# Patient Record
Sex: FEMALE | Race: WHITE
[De-identification: names, ages, dates, MRNs, and addresses within clinical notes are randomized per-mention and may not be internally consistent; named-entity substitution may affect disease eponyms.]

---

## 2023-07-19 ENCOUNTER — HOSPITAL ENCOUNTER
Age: 68
Discharge: HOME | End: 2023-07-19
Payer: MEDICARE

## 2023-07-19 DIAGNOSIS — M72.2: ICD-10-CM

## 2023-07-19 DIAGNOSIS — M70.72: ICD-10-CM

## 2023-07-19 DIAGNOSIS — M76.822: Primary | ICD-10-CM

## 2023-07-19 PROCEDURE — 73721 MRI JNT OF LWR EXTRE W/O DYE: CPT

## 2023-09-05 ENCOUNTER — HOSPITAL ENCOUNTER (EMERGENCY)
Age: 68
Discharge: HOME | End: 2023-09-05
Payer: MEDICARE

## 2023-09-05 VITALS
TEMPERATURE: 98.78 F | RESPIRATION RATE: 16 BRPM | SYSTOLIC BLOOD PRESSURE: 121 MMHG | HEART RATE: 78 BPM | DIASTOLIC BLOOD PRESSURE: 77 MMHG | OXYGEN SATURATION: 96 %

## 2023-09-05 VITALS — BODY MASS INDEX: 34.9 KG/M2
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
## 2023-09-05 DIAGNOSIS — S59.812A: ICD-10-CM

## 2023-09-05 DIAGNOSIS — Z79.899: ICD-10-CM

## 2023-09-05 DIAGNOSIS — S60.222A: ICD-10-CM

## 2023-09-05 DIAGNOSIS — S93.402A: Primary | ICD-10-CM

## 2023-09-05 PROCEDURE — 73130 X-RAY EXAM OF HAND: CPT

## 2023-09-05 PROCEDURE — 73610 X-RAY EXAM OF ANKLE: CPT

## 2023-09-05 PROCEDURE — 99284 EMERGENCY DEPT VISIT MOD MDM: CPT

## 2023-09-05 PROCEDURE — 73590 X-RAY EXAM OF LOWER LEG: CPT

## 2023-09-05 PROCEDURE — 73090 X-RAY EXAM OF FOREARM: CPT

## 2023-09-29 ENCOUNTER — HOSPITAL ENCOUNTER
Dept: HOSPITAL 101 - RAD | Age: 68
Discharge: HOME | End: 2023-09-29
Payer: MEDICARE

## 2023-09-29 DIAGNOSIS — S99.912D: Primary | ICD-10-CM

## 2023-09-29 DIAGNOSIS — S69.92XD: ICD-10-CM

## 2023-09-29 PROCEDURE — 73610 X-RAY EXAM OF ANKLE: CPT

## 2023-09-29 PROCEDURE — 73110 X-RAY EXAM OF WRIST: CPT

## 2023-10-11 ENCOUNTER — HOSPITAL ENCOUNTER
Dept: HOSPITAL 101 - RAD | Age: 68
Discharge: HOME | End: 2023-10-11
Payer: MEDICARE

## 2023-10-11 ENCOUNTER — TELEPHONE (OUTPATIENT)
Dept: CARDIOLOGY | Facility: CLINIC | Age: 68
End: 2023-10-11
Payer: COMMERCIAL

## 2023-10-11 DIAGNOSIS — M20.12: Primary | ICD-10-CM

## 2023-10-11 DIAGNOSIS — M24.674: ICD-10-CM

## 2023-10-11 DIAGNOSIS — M19.072: ICD-10-CM

## 2023-10-11 DIAGNOSIS — M76.821: ICD-10-CM

## 2023-10-11 DIAGNOSIS — M19.071: ICD-10-CM

## 2023-10-11 PROCEDURE — 73610 X-RAY EXAM OF ANKLE: CPT

## 2023-10-11 PROCEDURE — 73630 X-RAY EXAM OF FOOT: CPT

## 2023-10-11 NOTE — TELEPHONE ENCOUNTER
Patient phoned states she is actively having chest pain. Just had appointment with her Pediatrist , took BP in office 144/84. Patient had taken nitroglycerin with relief that lasted for 10-15 minutes. Advised patient to go to ER for chest pain. States she is on her way. Will contact office with update on her condition.

## 2023-10-25 ENCOUNTER — APPOINTMENT (OUTPATIENT)
Dept: CARDIOLOGY | Facility: CLINIC | Age: 68
End: 2023-10-25
Payer: COMMERCIAL

## 2024-05-08 ENCOUNTER — HOSPITAL ENCOUNTER
Dept: HOSPITAL 101 - EC | Age: 69
Discharge: HOME | End: 2024-05-08
Payer: MEDICARE

## 2024-05-08 DIAGNOSIS — M76.821: ICD-10-CM

## 2024-05-08 DIAGNOSIS — M76.822: ICD-10-CM

## 2024-05-08 DIAGNOSIS — M20.12: Primary | ICD-10-CM

## 2024-05-08 DIAGNOSIS — M20.11: ICD-10-CM

## 2024-05-08 PROCEDURE — 73630 X-RAY EXAM OF FOOT: CPT

## 2024-05-08 PROCEDURE — 73610 X-RAY EXAM OF ANKLE: CPT

## 2024-09-11 ENCOUNTER — HOSPITAL ENCOUNTER
Dept: HOSPITAL 101 - EC | Age: 69
Discharge: HOME | End: 2024-09-11
Payer: MEDICARE

## 2024-09-11 DIAGNOSIS — M20.12: ICD-10-CM

## 2024-09-11 DIAGNOSIS — M24.674: ICD-10-CM

## 2024-09-11 DIAGNOSIS — M79.672: ICD-10-CM

## 2024-09-11 DIAGNOSIS — M79.671: Primary | ICD-10-CM

## 2024-09-11 PROCEDURE — 73630 X-RAY EXAM OF FOOT: CPT

## 2024-09-24 DIAGNOSIS — E78.2 MIXED HYPERLIPIDEMIA: Primary | ICD-10-CM

## 2024-09-25 RX ORDER — ATORVASTATIN CALCIUM 40 MG/1
40 TABLET, FILM COATED ORAL DAILY
Qty: 28 TABLET | Refills: 11 | Status: SHIPPED | OUTPATIENT
Start: 2024-09-25

## 2024-11-04 ENCOUNTER — HOSPITAL ENCOUNTER
Dept: HOSPITAL 101 - MRI | Age: 69
Discharge: HOME | End: 2024-11-04
Payer: MEDICARE

## 2024-11-04 DIAGNOSIS — M76.821: Primary | ICD-10-CM

## 2024-11-04 PROCEDURE — 73721 MRI JNT OF LWR EXTRE W/O DYE: CPT

## 2024-11-21 ENCOUNTER — TELEPHONE (OUTPATIENT)
Dept: CARDIOLOGY | Facility: CLINIC | Age: 69
End: 2024-11-21
Payer: COMMERCIAL

## 2024-11-21 NOTE — TELEPHONE ENCOUNTER
Patient phones stating she has been experiencing some bilateral swelling in her lower extremities for a couple of months. Reports it is getting worse and her legs hurt and cramp. She states are calves no longer feel soft but are starting to feel firm and hard. PCP prescribed furosemide 40 mg bid but patient states it is not helping. She is looking to get in for an office visit. Patient last seen 10/25/2022 and was instructed to follow up on an annual basis. Please advise

## 2024-11-21 NOTE — TELEPHONE ENCOUNTER
Spoke with patient and informed her she would be added to the wait list.     Sent to clerical to add patient

## 2024-12-13 ENCOUNTER — APPOINTMENT (OUTPATIENT)
Dept: CARDIOLOGY | Facility: CLINIC | Age: 69
End: 2024-12-13
Payer: COMMERCIAL

## 2025-01-31 ENCOUNTER — APPOINTMENT (OUTPATIENT)
Dept: CARDIOLOGY | Facility: CLINIC | Age: 70
End: 2025-01-31
Payer: COMMERCIAL

## 2025-01-31 VITALS
DIASTOLIC BLOOD PRESSURE: 78 MMHG | HEART RATE: 78 BPM | WEIGHT: 175.4 LBS | BODY MASS INDEX: 34.44 KG/M2 | SYSTOLIC BLOOD PRESSURE: 116 MMHG | HEIGHT: 60 IN

## 2025-01-31 DIAGNOSIS — Z78.9 NEVER SMOKED TOBACCO: ICD-10-CM

## 2025-01-31 DIAGNOSIS — E66.811 OBESITY, CLASS I, BMI 30-34.9: ICD-10-CM

## 2025-01-31 DIAGNOSIS — I89.0 LYMPHEDEMA: Primary | ICD-10-CM

## 2025-01-31 DIAGNOSIS — Z95.5 STENTED CORONARY ARTERY: ICD-10-CM

## 2025-01-31 DIAGNOSIS — E78.2 MIXED HYPERLIPIDEMIA: ICD-10-CM

## 2025-01-31 DIAGNOSIS — I25.10 CORONARY ARTERY DISEASE INVOLVING NATIVE CORONARY ARTERY OF NATIVE HEART WITHOUT ANGINA PECTORIS: ICD-10-CM

## 2025-01-31 PROBLEM — R60.9 EDEMA: Status: ACTIVE | Noted: 2025-01-31

## 2025-01-31 PROCEDURE — 1159F MED LIST DOCD IN RCRD: CPT | Performed by: INTERNAL MEDICINE

## 2025-01-31 PROCEDURE — 99214 OFFICE O/P EST MOD 30 MIN: CPT | Performed by: INTERNAL MEDICINE

## 2025-01-31 PROCEDURE — 1036F TOBACCO NON-USER: CPT | Performed by: INTERNAL MEDICINE

## 2025-01-31 PROCEDURE — 3008F BODY MASS INDEX DOCD: CPT | Performed by: INTERNAL MEDICINE

## 2025-01-31 RX ORDER — GABAPENTIN 300 MG/1
300 CAPSULE ORAL 3 TIMES DAILY
COMMUNITY
Start: 2024-05-08

## 2025-01-31 RX ORDER — MIRABEGRON 50 MG/1
1 TABLET, FILM COATED, EXTENDED RELEASE ORAL DAILY
COMMUNITY
Start: 2023-09-05

## 2025-01-31 RX ORDER — TRAZODONE HYDROCHLORIDE 100 MG/1
1 TABLET ORAL NIGHTLY
COMMUNITY
Start: 2024-04-30

## 2025-01-31 RX ORDER — FUROSEMIDE 40 MG/1
40 TABLET ORAL 2 TIMES DAILY
COMMUNITY
Start: 2024-06-03 | End: 2025-01-31 | Stop reason: SDUPTHER

## 2025-01-31 RX ORDER — PAROXETINE 30 MG/1
1 TABLET, FILM COATED ORAL
COMMUNITY
Start: 2024-09-25

## 2025-01-31 RX ORDER — MELOXICAM 15 MG/1
1 TABLET ORAL DAILY
COMMUNITY

## 2025-01-31 RX ORDER — ASPIRIN 81 MG/1
81 TABLET ORAL DAILY
COMMUNITY
Start: 2022-10-25

## 2025-01-31 RX ORDER — BUSPIRONE HYDROCHLORIDE 30 MG/1
1 TABLET ORAL 2 TIMES DAILY
COMMUNITY
Start: 2021-05-17

## 2025-01-31 RX ORDER — FUROSEMIDE 40 MG/1
40 TABLET ORAL DAILY
Start: 2025-01-31

## 2025-01-31 RX ORDER — NITROGLYCERIN 0.4 MG/1
0.4 TABLET SUBLINGUAL EVERY 5 MIN PRN
COMMUNITY
Start: 2021-02-22

## 2025-01-31 NOTE — PROGRESS NOTES
Subjective   Stacy Willett is a 69 y.o. female       Chief Complaint    Follow-up          HPI   Patient is in the office for follow-up for the problems noted below.  Her main concern today was development of swelling of the lower extremities which is consistent with lymphedema.  She has reported no angina dyspnea palpitations or shortness of breath.  She has been compliant taking her statin and aspirin.  There has been no blood work since her last visit with me which was over 2 years ago.  She maintains active lifestyle and lives in [facility].  She lives with her brother.    ASSESSMENT AND PLAN:      1. Coronary artery disease, status post previous PCI of the left circumflex 2009, with no recurrent disease. Emphasized need to exercise, weight control, and continue present medical therapy. Her last stress test was in 2022 and was normal  2. Hyperlipidemia, on medical therapy, patient need lipid profile which I ordered, target LDL 70 or less  3.  Class I obesity. The patient was advised to work on her low calorie diet and more regular exercise, she is interested in using Ozempic which I do not have any objections  4.  Lymphedema, education regarding the lymphedema was provided, compression stockings were recommended, diuretic therapy was discouraged.  Weight loss was advocated.     Annual visit will be scheduled  Review of Systems   Cardiovascular:  Positive for leg swelling.            Vitals:    01/31/25 1422   BP: 116/78   BP Location: Left arm   Patient Position: Sitting   Pulse: 78   Weight: 79.6 kg (175 lb 6.4 oz)   Height: 1.524 m (5')        Objective   Physical Exam  Constitutional:       Appearance: Normal appearance.   HENT:      Nose: Nose normal.   Neck:      Vascular: No carotid bruit.   Cardiovascular:      Rate and Rhythm: Normal rate.      Pulses: Normal pulses.      Heart sounds: Normal heart sounds.   Pulmonary:      Effort: Pulmonary effort is normal.   Abdominal:      General: Bowel sounds are  normal.      Palpations: Abdomen is soft.   Musculoskeletal:         General: Normal range of motion.      Cervical back: Normal range of motion.      Right lower leg: No edema.      Left lower leg: No edema.   Skin:     General: Skin is warm and dry.   Neurological:      General: No focal deficit present.      Mental Status: She is alert.   Psychiatric:         Mood and Affect: Mood normal.         Behavior: Behavior normal.         Thought Content: Thought content normal.         Judgment: Judgment normal.         Allergies  Morphine, Dilantin infatabs [phenytoin], and Topamax [topiramate]     Current Medications    Current Outpatient Medications:     aspirin 81 mg EC tablet, Take 1 tablet (81 mg) by mouth once daily., Disp: , Rfl:     atorvastatin (Lipitor) 40 mg tablet, TAKE ONE TABLET BY MOUTH ONCE DAILY, Disp: 28 tablet, Rfl: 11    busPIRone (Buspar) 30 mg tablet, Take 1 tablet (30 mg) by mouth 2 times a day., Disp: , Rfl:     gabapentin (Neurontin) 300 mg capsule, Take 1 capsule (300 mg) by mouth 3 times a day., Disp: , Rfl:     meloxicam (Mobic) 15 mg tablet, Take 1 tablet (15 mg) by mouth once daily., Disp: , Rfl:     Myrbetriq 50 mg tablet extended release 24 hr 24 hr tablet, Take 1 tablet (50 mg) by mouth once daily., Disp: , Rfl:     nitroglycerin (Nitrostat) 0.4 mg SL tablet, Place 1 tablet (0.4 mg) under the tongue every 5 minutes if needed for chest pain., Disp: , Rfl:     PARoxetine (Paxil) 30 mg tablet, Take 1 tablet (30 mg) by mouth once daily in the morning. Take before meals., Disp: , Rfl:     traZODone (Desyrel) 100 mg tablet, Take 1 tablet (100 mg) by mouth once daily at bedtime., Disp: , Rfl:     furosemide (Lasix) 40 mg tablet, Take 1 tablet (40 mg) by mouth once daily., Disp: , Rfl:                      Assessment/Plan   1. Lymphedema  Follow Up In Cardiology    Alanine Aminotransferase    Aspartate Aminotransferase    Basic Metabolic Panel    CBC    furosemide (Lasix) 40 mg tablet     Alanine Aminotransferase    Aspartate Aminotransferase    Basic Metabolic Panel    CBC      2. Mixed hyperlipidemia        3. BMI 34.0-34.9,adult        4. Never smoked tobacco        5. Coronary artery disease involving native coronary artery of native heart without angina pectoris        6. Stented coronary artery        7. Obesity, Class I, BMI 30-34.9                 Scribe Attestation  By signing my name below, I, Bonnie HARRIS RN   , Scribe   attest that this documentation has been prepared under the direction and in the presence of Maria Del Carmen Perkins MD.     Provider Attestation - Scribe documentation    All medical record entries made by the Scribe were at my direction and personally dictated by me. I have reviewed the chart and agree that the record accurately reflects my personal performance of the history, physical exam, discussion and plan.

## 2025-01-31 NOTE — PATIENT INSTRUCTIONS
Please bring all medicines, vitamins, and herbal supplements with you when you come to the office.    Prescriptions will not be filled unless you are compliant with your follow up appointments or have a follow up appointment scheduled as per instruction of your physician. Refills should be requested at the time of your visit.     BMI was above normal measurement. Current weight: 79.6 kg (175 lb 6.4 oz)  Weight change since last visit (-) denotes wt loss -2.6 lbs   Weight loss needed to achieve BMI 25: 47.7 Lbs  Weight loss needed to achieve BMI 30: 22.1 Lbs  Provided instructions on dietary changes  Provided instructions on exercise.    Decrease lasix to once a day then every other day     Compression stocking pressure should be 25-30

## 2025-01-31 NOTE — LETTER
January 31, 2025     Magali Perales PA-C  [address]  [facility] [place] [address]    Patient: Stacy Willett   YOB: 1955   Date of Visit: 1/31/2025       Dear Dr. Magali Perales PA-C:    Thank you for referring Stacy Willett to me for evaluation. Below are my notes for this consultation.  If you have questions, please do not hesitate to call me. I look forward to following your patient along with you.       Sincerely,     Maria Del Carmen Perkins MD      CC: No Recipients  ______________________________________________________________________________________    Subjective   Stacy Willett is a 69 y.o. female       Chief Complaint    Follow-up          HPI   Patient is in the office for follow-up for the problems noted below.  Her main concern today was development of swelling of the lower extremities which is consistent with lymphedema.  She has reported no angina dyspnea palpitations or shortness of breath.  She has been compliant taking her statin and aspirin.  There has been no blood work since her last visit with me which was over 2 years ago.  She maintains active lifestyle and lives in [facility].  She lives with her brother.    ASSESSMENT AND PLAN:      1. Coronary artery disease, status post previous PCI of the left circumflex 2009, with no recurrent disease. Emphasized need to exercise, weight control, and continue present medical therapy. Her last stress test was in 2022 and was normal  2. Hyperlipidemia, on medical therapy, patient need lipid profile which I ordered, target LDL 70 or less  3.  Class I obesity. The patient was advised to work on her low calorie diet and more regular exercise, she is interested in using Ozempic which I do not have any objections  4.  Lymphedema, education regarding the lymphedema was provided, compression stockings were recommended, diuretic therapy was discouraged.  Weight loss was advocated.     Annual visit will be scheduled  Review of Systems    Cardiovascular:  Positive for leg swelling.            Vitals:    01/31/25 1422   BP: 116/78   BP Location: Left arm   Patient Position: Sitting   Pulse: 78   Weight: 79.6 kg (175 lb 6.4 oz)   Height: 1.524 m (5')        Objective   Physical Exam  Constitutional:       Appearance: Normal appearance.   HENT:      Nose: Nose normal.   Neck:      Vascular: No carotid bruit.   Cardiovascular:      Rate and Rhythm: Normal rate.      Pulses: Normal pulses.      Heart sounds: Normal heart sounds.   Pulmonary:      Effort: Pulmonary effort is normal.   Abdominal:      General: Bowel sounds are normal.      Palpations: Abdomen is soft.   Musculoskeletal:         General: Normal range of motion.      Cervical back: Normal range of motion.      Right lower leg: No edema.      Left lower leg: No edema.   Skin:     General: Skin is warm and dry.   Neurological:      General: No focal deficit present.      Mental Status: She is alert.   Psychiatric:         Mood and Affect: Mood normal.         Behavior: Behavior normal.         Thought Content: Thought content normal.         Judgment: Judgment normal.         Allergies  Morphine, Dilantin infatabs [phenytoin], and Topamax [topiramate]     Current Medications    Current Outpatient Medications:   •  aspirin 81 mg EC tablet, Take 1 tablet (81 mg) by mouth once daily., Disp: , Rfl:   •  atorvastatin (Lipitor) 40 mg tablet, TAKE ONE TABLET BY MOUTH ONCE DAILY, Disp: 28 tablet, Rfl: 11  •  busPIRone (Buspar) 30 mg tablet, Take 1 tablet (30 mg) by mouth 2 times a day., Disp: , Rfl:   •  gabapentin (Neurontin) 300 mg capsule, Take 1 capsule (300 mg) by mouth 3 times a day., Disp: , Rfl:   •  meloxicam (Mobic) 15 mg tablet, Take 1 tablet (15 mg) by mouth once daily., Disp: , Rfl:   •  Myrbetriq 50 mg tablet extended release 24 hr 24 hr tablet, Take 1 tablet (50 mg) by mouth once daily., Disp: , Rfl:   •  nitroglycerin (Nitrostat) 0.4 mg SL tablet, Place 1 tablet (0.4 mg) under the  tongue every 5 minutes if needed for chest pain., Disp: , Rfl:   •  PARoxetine (Paxil) 30 mg tablet, Take 1 tablet (30 mg) by mouth once daily in the morning. Take before meals., Disp: , Rfl:   •  traZODone (Desyrel) 100 mg tablet, Take 1 tablet (100 mg) by mouth once daily at bedtime., Disp: , Rfl:   •  furosemide (Lasix) 40 mg tablet, Take 1 tablet (40 mg) by mouth once daily., Disp: , Rfl:                      Assessment/Plan   1. Lymphedema  Follow Up In Cardiology    Alanine Aminotransferase    Aspartate Aminotransferase    Basic Metabolic Panel    CBC    furosemide (Lasix) 40 mg tablet    Alanine Aminotransferase    Aspartate Aminotransferase    Basic Metabolic Panel    CBC      2. Mixed hyperlipidemia        3. BMI 34.0-34.9,adult        4. Never smoked tobacco        5. Coronary artery disease involving native coronary artery of native heart without angina pectoris        6. Stented coronary artery        7. Obesity, Class I, BMI 30-34.9                 Scribe Attestation  By signing my name below, I, Bonnie HARRIS RN   , Scribe   attest that this documentation has been prepared under the direction and in the presence of Maria Del Carmen Perkins MD.     Provider Attestation - Scribe documentation    All medical record entries made by the Scribe were at my direction and personally dictated by me. I have reviewed the chart and agree that the record accurately reflects my personal performance of the history, physical exam, discussion and plan.

## 2025-08-22 DIAGNOSIS — E78.2 MIXED HYPERLIPIDEMIA: ICD-10-CM

## 2025-08-25 RX ORDER — ATORVASTATIN CALCIUM 40 MG/1
40 TABLET, FILM COATED ORAL DAILY
Qty: 90 TABLET | Refills: 3 | Status: SHIPPED | OUTPATIENT
Start: 2025-08-25

## 2026-02-04 ENCOUNTER — APPOINTMENT (OUTPATIENT)
Dept: CARDIOLOGY | Facility: CLINIC | Age: 71
End: 2026-02-04
Payer: MEDICARE